# Patient Record
(demographics unavailable — no encounter records)

---

## 2024-11-08 NOTE — REVIEW OF SYSTEMS
[Chest Pain] : chest pain [Joint Pain] : joint pain [Negative] : Neurological [Palpitations] : no palpitations [Lower Ext Edema] : no lower extremity edema [Joint Swelling] : no joint swelling

## 2024-11-08 NOTE — PLAN
[FreeTextEntry1] : DM Last hemoglobin A1c was 6.5.  Will recheck today.  work on her diet and increase exercise.  Limit carbohydrates and sweets.  She needs weight loss. Discussed using the [employer] dionicio to help loser weight. Pool exercise if she can get access to a pool  Obesity Work on weight with improved diet and exercise.  Diet should consist of lean proteins, whole grains, low fat dairy, fruits and vegetables and portion control. Avoid refined carbohydrates, refined sugars and processed foods. Avoid drinking calories.  She should get 150 minutes/week of moderate intensity exercise  Hypertension Repeat blood pressure taken by me was 150/90.  She may have an element of whitecoat hypertension.  Continue metoprolol and amlodipine/benazepril.  Health maintenance Depression screening negative Pap smear is up-to-date Mammogram up-to-date Colonoscopy up-to-date Follow-up 3 months 07-Jun-2022 16:30 Detail Level: Detailed

## 2024-11-08 NOTE — HEALTH RISK ASSESSMENT
[0] : 2) Feeling down, depressed, or hopeless: Not at all (0) [PHQ-2 Negative - No further assessment needed] : PHQ-2 Negative - No further assessment needed [Never] : Never [de-identified] : social [[id number]] : 0

## 2024-11-08 NOTE — PHYSICAL EXAM
[No Acute Distress] : no acute distress [Normal Sclera/Conjunctiva] : normal sclera/conjunctiva [Normal Outer Ear/Nose] : the outer ears and nose were normal in appearance [No Respiratory Distress] : no respiratory distress  [No Accessory Muscle Use] : no accessory muscle use [Clear to Auscultation] : lungs were clear to auscultation bilaterally [Normal Rate] : normal rate  [Regular Rhythm] : with a regular rhythm [Normal S1, S2] : normal S1 and S2 [No Edema] : there was no peripheral edema [Normal Affect] : the affect was normal [Normal Insight/Judgement] : insight and judgment were intact [de-identified] : overweight

## 2024-11-08 NOTE — HISTORY OF PRESENT ILLNESS
[FreeTextEntry1] : follow up [de-identified] : 56 y/o female with h/o obesity, HTN and arthritis who is here for follow up SHe c/o numbness and tingling in her fingers and left big toe Stopped regular sugar. Stopped red meat. More fruits and vegetables. No creamers.  Saw cardiology after last visit and everything was ok  , HbA1c 6.5 last visit.  Has made changes to her diet. Decreasing sugar. Not much exercise. Only a little walking. because of knee pain. Needs BMI under 40 to have knee surgery   pap 10/23.. scheduled for december mammo 5/24 Colonoscopy 2023.. gets them yearly.  She thinks that she had multiple polyps

## 2024-11-18 NOTE — HISTORY OF PRESENT ILLNESS
[FreeTextEntry1] : She has had tingling on her right front of her thigh for many years. she had nerve testing over 5 years ago but was told it was normal.  She also has numbness and tingling on her toes for 3 weeks.  numbness on her fingers for 2 years. Now it is more bothersome. It wakes her at night. She has to shake her hands to make it go away. PMH: HTN, Pre-DM A1C 6.5% for 6 months. JOSHUA.   Her left thigh sensation is at the front and lateral side of it. No weakness.   She is a manager at [employer].  No weakness.

## 2024-11-18 NOTE — DISCUSSION/SUMMARY
[FreeTextEntry1] : Ms. Coe is a 58 yo woman here for hand numbness, big toes numbness and long-standing dysesthesia at left thigh. I think she has CTS and hx of meralgia paresthetica. will do EMG recommend wrist splint at night. Refrain from repetitive flexion movement on her wrist. weight loss, exercise, no wearing tight cloth/belt All questions answered. RTC after testing I spent the time noted on the day of this patient encounter preparing for, providing and documenting the above E/M service and counseling and educate patient on differential, workup, disease course, and treatment/management. Education was provided to the patient during this encounter. All questions and concerns were answered and addressed in detail. The patient verbalized understanding and agreed to plan. Patient was advised to continue to monitor for neurologic symptoms and to notify my office or go to the nearest emergency room if there are any changes. Any orders/referrals and communications were provided as well. side effects of the above medications were discussed in detail including but not limited to applicable black box warning and teratogenicity as appropriate.  Patient was advised to bring previous records to my office.

## 2024-11-18 NOTE — PHYSICAL EXAM

## 2025-01-16 NOTE — PLAN
[FreeTextEntry1] : Bronchitis will get a chest xray to r/o pneumonia will give doxycycline 100 mg bid x 7 days Corcidin for  cough ALbuterol as needed  HTN Stable on amlodipine/benazepril 5/20 and metoprolol 50  f/u for physical

## 2025-01-16 NOTE — REVIEW OF SYSTEMS
[Fatigue] : fatigue [Fever] : no fever [Sore Throat] : sore throat [Earache] : earache [Shortness Of Breath] : shortness of breath [Wheezing] : wheezing [Cough] : cough [Negative] : Neurological

## 2025-01-16 NOTE — PHYSICAL EXAM
[No Acute Distress] : no acute distress [Well Nourished] : well nourished [Normal Sclera/Conjunctiva] : normal sclera/conjunctiva [PERRL] : pupils equal round and reactive to light [Normal Outer Ear/Nose] : the outer ears and nose were normal in appearance [Normal TMs] : both tympanic membranes were normal [No Lymphadenopathy] : no lymphadenopathy [Supple] : supple [No Respiratory Distress] : no respiratory distress  [No Accessory Muscle Use] : no accessory muscle use [Normal Rate] : normal rate  [Regular Rhythm] : with a regular rhythm [Normal S1, S2] : normal S1 and S2 [Normal Affect] : the affect was normal [Normal Insight/Judgement] : insight and judgment were intact [de-identified] : scattered wheezing which cleared with coughing

## 2025-01-16 NOTE — HISTORY OF PRESENT ILLNESS
[FreeTextEntry8] :  Went to  in Dec. COVID, flu and strep neg. GIven a zpack and benzonatate. Felt improved but the cough lingered Last week sick again. Has a cough with sputum. Had labored breathing. no fever. hears wheezing. Used her inhaler 3 days last week. Stopped benzonatate yesterday. SHe felt like it made her too dry.

## 2025-07-24 NOTE — PHYSICAL EXAM
[No Acute Distress] : no acute distress [Normal Voice/Communication] : normal voice/communication [No Respiratory Distress] : no respiratory distress  [Normal Affect] : the affect was normal [Normal Insight/Judgement] : insight and judgment were intact

## 2025-07-24 NOTE — HISTORY OF PRESENT ILLNESS
[Other Location: e.g. School (Enter Location, City,State)___] : at [unfilled], at the time of the visit. [Medical Office: ([facility])___] : at the medical office located in  [Telehealth (audio & video)] : This visit was provided via telehealth using real-time 2-way audio visual technology. [Verbal consent obtained from patient] : the patient, [unfilled] [FreeTextEntry8] : 1 week ago she developed mid back pain that radiated to her chest. It is mostly on the left side. It is a constant pain. It is hard to bend, turn or twist. Worse with deep breath and movement She started taking advil 4 days ago she went to urgent care. She was given flexeril and prednisone for 5 days. Using a heating pain and tiger balm. No improvement.

## 2025-07-30 NOTE — HEALTH RISK ASSESSMENT
[Little interest or pleasure doing things] : 1) Little interest or pleasure doing things [Feeling down, depressed, or hopeless] : 2) Feeling down, depressed, or hopeless [0] : 2) Feeling down, depressed, or hopeless: Not at all (0) [PHQ-2 Negative - No further assessment needed] : PHQ-2 Negative - No further assessment needed [Never] : Never [Patient reported mammogram was normal] : Patient reported mammogram was normal [Patient reported PAP Smear was normal] : Patient reported PAP Smear was normal [HIV Test offered] : HIV Test offered [With Family] : lives with family [Employed] : employed [] :  [# Of Children ___] : has [unfilled] children [[id number]] : 0 [Reports changes in hearing] : Reports no changes in hearing [Reports changes in vision] : Reports no changes in vision [Reports changes in dental health] : Reports no changes in dental health [MammogramDate] : 12/22 [PapSmearDate] : 10/23 [ColonoscopyDate] : 03/23 [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

## 2025-07-30 NOTE — HISTORY OF PRESENT ILLNESS
[FreeTextEntry1] : Annual physical [de-identified] : 57 y/o female with h/o obesity, HTN, JOSHUA not on CPAP and arthritis who is here for an annual physical. SHe stopped using the CPAP due discomfort with the mask She was treated with Naproxen for costochondritis Recently she had 5 days of vaginal bleeding. It was just after taking prednisone, Her LMP prior was more than 1 year earlier Has b/l knee pain.She had cotisone shots. needs a knee replacement. Needs to get BMI under 40 Has gained 20 pounds in the past 5 years No exercise Cut out a lot of sugary foods. Portions are ok. Has emotional eating.   pap 10/23 mammo 2022 Colonoscopy 2023..   She thinks that she had multiple polyps with patient

## 2025-07-30 NOTE — PLAN
[FreeTextEntry1] : Obesity Work on weight with improved diet and exercise.  Diet should consist of lean proteins, whole grains, low fat dairy, fruits and vegetables and portion control. Avoid refined carbohydrates, refined sugars and processed foods. Avoid drinking calories.  She should get 150 minutes/week of moderate intensity exercise We discussed bariatric surgery vs medications for weight loss. SHe is not sure which is the better option for Will send her to bariatric surgery to discuss the options  Hypertension Blood pressure is okay on amlodipine/benazepril 5/20 mg and metoprolol ER 50 mg.  Will continue current dose for now. She must work on improving her diet and getting exercise  Health maintenance Depression screening negative Blood pressure is controlled Check labs today, including CBC, CMP, TSH, HbA1c, lipids.. Blood collected in office.  Due for a Pap smear Given a prescription for mammogram Colonoscopy up-to-date Follow-up 6 months or as needed based on above

## 2025-07-30 NOTE — PHYSICAL EXAM
[No Acute Distress] : no acute distress [Well-Appearing] : well-appearing [Normal Sclera/Conjunctiva] : normal sclera/conjunctiva [PERRL] : pupils equal round and reactive to light [EOMI] : extraocular movements intact [Normal Outer Ear/Nose] : the outer ears and nose were normal in appearance [Normal Oropharynx] : the oropharynx was normal [Normal TMs] : both tympanic membranes were normal [No JVD] : no jugular venous distention [No Lymphadenopathy] : no lymphadenopathy [Supple] : supple [Thyroid Normal, No Nodules] : the thyroid was normal and there were no nodules present [No Respiratory Distress] : no respiratory distress  [No Accessory Muscle Use] : no accessory muscle use [Clear to Auscultation] : lungs were clear to auscultation bilaterally [Normal Rate] : normal rate  [Regular Rhythm] : with a regular rhythm [Normal S1, S2] : normal S1 and S2 [No Murmur] : no murmur heard [No Carotid Bruits] : no carotid bruits [No Varicosities] : no varicosities [Pedal Pulses Present] : the pedal pulses are present [No Edema] : there was no peripheral edema [No Extremity Clubbing/Cyanosis] : no extremity clubbing/cyanosis [Normal Appearance] : normal in appearance [No Nipple Discharge] : no nipple discharge [No Axillary Lymphadenopathy] : no axillary lymphadenopathy [Soft] : abdomen soft [Non Tender] : non-tender [Non-distended] : non-distended [No Masses] : no abdominal mass palpated [No HSM] : no HSM [Normal Bowel Sounds] : normal bowel sounds [Normal Posterior Cervical Nodes] : no posterior cervical lymphadenopathy [Normal Anterior Cervical Nodes] : no anterior cervical lymphadenopathy [No CVA Tenderness] : no CVA  tenderness [No Spinal Tenderness] : no spinal tenderness [No Joint Swelling] : no joint swelling [Grossly Normal Strength/Tone] : grossly normal strength/tone [No Rash] : no rash [Coordination Grossly Intact] : coordination grossly intact [No Focal Deficits] : no focal deficits [Normal Gait] : normal gait [Deep Tendon Reflexes (DTR)] : deep tendon reflexes were 2+ and symmetric [Normal Affect] : the affect was normal [Alert and Oriented x3] : oriented to person, place, and time [Normal Insight/Judgement] : insight and judgment were intact [de-identified] : Overweight